# Patient Record
Sex: FEMALE
[De-identification: names, ages, dates, MRNs, and addresses within clinical notes are randomized per-mention and may not be internally consistent; named-entity substitution may affect disease eponyms.]

---
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
## 2022-10-20 ENCOUNTER — HOSPITAL ENCOUNTER (OUTPATIENT)
Dept: HOSPITAL 47 - RADUSWWP | Age: 22
Discharge: HOME | End: 2022-10-20
Attending: INTERNAL MEDICINE
Payer: COMMERCIAL

## 2022-10-20 DIAGNOSIS — N83.8: Primary | ICD-10-CM

## 2022-10-20 PROCEDURE — 76856 US EXAM PELVIC COMPLETE: CPT

## 2022-10-20 PROCEDURE — 76857 US EXAM PELVIC LIMITED: CPT

## 2022-10-20 PROCEDURE — 76700 US EXAM ABDOM COMPLETE: CPT

## 2022-10-20 NOTE — US
EXAMINATION TYPE: US pelvic complete

 

DATE OF EXAM: 10/20/2022

 

COMPARISON: CT 2018

 

CLINICAL HISTORY: R10.12 Abdominal Pain, R10.9 flank pain left. Left sided pain x 2 weeks- sharp pain
s intermittently. G0. 

 

TECHNIQUE:  Transabdominal (TA).  Transabdominal sonographic images of the pelvis were acquired.  Tra
nsvaginal sonographic images were deferred due to patient's refusal and pelvic anatomy was visualized
 transabdominally.

 

Date of LMP:  10/17/2022

 

EXAM MEASUREMENTS:

 

Uterus:  8.1 x 5.6 x 3.8 cm

Endometrial Stripe: 0.36 cm

Right Ovary:  3.9 x 2.0 x 1.9 cm

Left Ovary:  7.0 x 5.9 x 5.1 cm

 

 

 

1. Uterus:  Anteverted   Appears wnl.

2. Endometrium:  Appears wnl

3. Right Ovary:  Appears wnl

4. Left Ovary:  *Appears enlarged. Complex area seen: 5.7 x 5.2 x 4.2 cm.

**Spectral, color and waveform doppler imaging shows good arterial and venous flow within the ovaries
. Performed due to pt stating she gets sharp pains.

5. Bilateral Adnexa:  Appear wnl

6. Posterior cul-de-sac:  *Fluid is visualized.

 

Anteverted uterus. Endometrial stripe within normal limits for proliferative phase of menstrual cycle
. Small amount free fluid in pelvic cul-de-sac is nonspecific.

 

Right ovary is normal in size and appears unremarkable. Left ovary has new heterogeneous 5.7 x 5.2 x 
4.2 cm oval circumscribed hypoechoic to anechoic mass with suggestion of some increased through trans
mission and possible vessel along the periphery. No internal vascularity.

 

IMPRESSION: There is new 5.7 cm mass in the left ovary favoring solid lesion. O-RADS 3 lesion. Low ri
sk. Patient may benefit with MRI evaluation. Consider gynecology oncology referral.

## 2022-10-20 NOTE — US
EXAMINATION TYPE: US abdomen comp/pelvis limited

 

DATE OF EXAM: 10/20/2022

 

COMPARISON: CT urogram 2018

 

CLINICAL HISTORY: R10.12 Abdominal Pain, R10.9 flank pain left. Pain on the left side x 2 weeks. 

 

EXAM MEASUREMENTS:

 

Liver Length:  16.3 cm   

Gallbladder Wall:  0.21 cm   

CBD:  0.25 cm

Spleen:  11.2 cm   

Right Kidney:  9.8 x 5.3 x 3.4 cm 

Left Kidney:  10.1 x 5.4 x 4.1 cm  

Post Void Residual:  No fluid seen. 

 

*Exam is limited due to overlying bowel gas.

 

Pancreas:  Limited visibility.

Liver:  No abnormalities seen.  

Gallbladder:  Appears anechoic.

CBD:  Appears wnl. 

Spleen:  Hyperechoic area seen centrally: 0.7 x 1.0 x 0.6 cm.   Too small to further characterize pre
sumed benign

Right Kidney:  Renal pelvis minimallyprominent.

Left Kidney:  Renal pelvis minimally prominent.   

Upper IVC:  Appears wnl  

Abd Aorta:  Appears wnl

Bladder: Appears wnl

**Bilateral Jets Seen Yes

**Normal Post Void Residual (normal less than 50ml) Yes, no fluid seen.

 

No aneurysm of the visualized abdominal aorta. Visualized pancreas appears within normal limits. Port
ions obscured by overlying bowel gas. Visualized liver unremarkable. No biliary dilatation. No mobile
 intraluminal gallstones. Kidneys symmetric and normal in size without hydronephrosis. Spleen normal 
in size. Bladder appears within normal limits prior to voiding with bilateral distal ureter jets seen
. Bladder completely emptied on voiding.

 

IMPRESSION: No acute findings are evident.

## 2023-06-14 ENCOUNTER — HOSPITAL ENCOUNTER (OUTPATIENT)
Dept: HOSPITAL 47 - LABWHC1 | Age: 23
Discharge: HOME | End: 2023-06-14
Attending: INTERNAL MEDICINE
Payer: COMMERCIAL

## 2023-06-14 DIAGNOSIS — Z00.00: Primary | ICD-10-CM

## 2023-06-14 DIAGNOSIS — E87.5: ICD-10-CM

## 2023-06-14 DIAGNOSIS — E03.9: ICD-10-CM

## 2023-06-14 DIAGNOSIS — E87.8: ICD-10-CM

## 2023-06-14 DIAGNOSIS — E55.9: ICD-10-CM

## 2023-06-14 DIAGNOSIS — D64.9: ICD-10-CM

## 2023-06-14 LAB
ALBUMIN SERPL-MCNC: 4.3 D/DL (ref 3.8–4.9)
ALBUMIN/GLOB SERPL: 1.48 RATIO (ref 1.6–3.17)
ALP SERPL-CCNC: 54 U/L (ref 41–126)
ALT SERPL-CCNC: 20 U/L (ref 8–44)
ANION GAP SERPL CALC-SCNC: 10.6 MMOL/L (ref 4–12)
AST SERPL-CCNC: 20 U/L (ref 13–35)
BASOPHILS # BLD AUTO: 0.03 X 10*3/UL (ref 0–0.1)
BASOPHILS NFR BLD AUTO: 0.6 %
BUN SERPL-SCNC: 5.1 MG/DL (ref 9–27)
BUN/CREAT SERPL: 8.5 RATIO (ref 12–20)
CALCIUM SPEC-MCNC: 9.6 MG/DL (ref 8.7–10.3)
CHLORIDE SERPL-SCNC: 106 MMOL/L (ref 96–109)
CHOLEST SERPL-MCNC: 160 MG/DL (ref 0–200)
CK SERPL-CCNC: 56 U/L (ref 26–186)
CO2 SERPL-SCNC: 23.4 MMOL/L (ref 21.6–31.8)
EOSINOPHIL # BLD AUTO: 0.12 X 10*3/UL (ref 0.04–0.35)
EOSINOPHIL NFR BLD AUTO: 2.3 %
ERYTHROCYTE [DISTWIDTH] IN BLOOD BY AUTOMATED COUNT: 4.6 X 10*6/UL (ref 4.1–5.2)
ERYTHROCYTE [DISTWIDTH] IN BLOOD: 13.3 % (ref 11.5–14.5)
FERRITIN SERPL-MCNC: 14.2 NG/ML (ref 10–291)
GLOBULIN SER CALC-MCNC: 2.9 D/DL (ref 1.6–3.3)
GLUCOSE SERPL-MCNC: 92 MG/DL (ref 70–110)
HCT VFR BLD AUTO: 38.9 % (ref 37.2–46.3)
HDLC SERPL-MCNC: 42.7 MG/DL (ref 40–60)
HGB BLD-MCNC: 12.4 D/DL (ref 12–15)
IMM GRANULOCYTES BLD QL AUTO: 0.6 %
IRON SERPL-MCNC: 49 UG/DL (ref 50–170)
LDLC SERPL CALC-MCNC: 82.1 MG/DL (ref 0–131)
LYMPHOCYTES # SPEC AUTO: 2.07 X 10*3/UL (ref 0.9–5)
LYMPHOCYTES NFR SPEC AUTO: 39.3 %
MAGNESIUM SPEC-SCNC: 2.2 MG/DL (ref 1.5–2.4)
MCH RBC QN AUTO: 27 PG (ref 27–32)
MCHC RBC AUTO-ENTMCNC: 31.9 D/DL (ref 32–37)
MCV RBC AUTO: 84.6 FL (ref 80–97)
MONOCYTES # BLD AUTO: 0.28 X 10*3/UL (ref 0.2–1)
MONOCYTES NFR BLD AUTO: 5.3 %
NEUTROPHILS # BLD AUTO: 2.74 X 10*3/UL (ref 1.8–7.7)
NEUTROPHILS NFR BLD AUTO: 51.9 %
NRBC BLD AUTO-RTO: 0 X 10*3/UL (ref 0–0.01)
PLATELET # BLD AUTO: 324 X 10*3/UL (ref 140–440)
POTASSIUM SERPL-SCNC: 4.8 MMOL/L (ref 3.5–5.5)
PROT SERPL-MCNC: 7.2 D/DL (ref 6.2–8.2)
SODIUM SERPL-SCNC: 140 MMOL/L (ref 135–145)
TIBC SERPL-MCNC: 416 UG/DL (ref 228–460)
TRIGL SERPL-MCNC: 176 MG/DL (ref 0–149)
VLDLC SERPL CALC-MCNC: 35.2 MG/DL (ref 5–40)
WBC # BLD AUTO: 5.27 X 10*3/UL (ref 4.5–10)

## 2023-06-14 PROCEDURE — 85025 COMPLETE CBC W/AUTO DIFF WBC: CPT

## 2023-06-14 PROCEDURE — 84100 ASSAY OF PHOSPHORUS: CPT

## 2023-06-14 PROCEDURE — 36415 COLL VENOUS BLD VENIPUNCTURE: CPT

## 2023-06-14 PROCEDURE — 82306 VITAMIN D 25 HYDROXY: CPT

## 2023-06-14 PROCEDURE — 83540 ASSAY OF IRON: CPT

## 2023-06-14 PROCEDURE — 80061 LIPID PANEL: CPT

## 2023-06-14 PROCEDURE — 83550 IRON BINDING TEST: CPT

## 2023-06-14 PROCEDURE — 80053 COMPREHEN METABOLIC PANEL: CPT

## 2023-06-14 PROCEDURE — 82550 ASSAY OF CK (CPK): CPT

## 2023-06-14 PROCEDURE — 83036 HEMOGLOBIN GLYCOSYLATED A1C: CPT

## 2023-06-14 PROCEDURE — 86140 C-REACTIVE PROTEIN: CPT

## 2023-06-14 PROCEDURE — 83735 ASSAY OF MAGNESIUM: CPT

## 2023-06-14 PROCEDURE — 82728 ASSAY OF FERRITIN: CPT

## 2023-06-14 PROCEDURE — 84443 ASSAY THYROID STIM HORMONE: CPT

## 2023-06-14 PROCEDURE — 85652 RBC SED RATE AUTOMATED: CPT
